# Patient Record
(demographics unavailable — no encounter records)

---

## 2025-01-28 NOTE — ASSESSMENT
[FreeTextEntry1] : ========================== HTN, diastolic LV dysfunction per TTE 3/2023  HLD  SSS;   {AF; s/p PPM; s/p ablation of AF and AT in 2020 (Dr. Massey)  Asc. aorta dilatation  Moderate MR, mild AI  CAD - nuclear stress in 2017 with infero-lat and apical defect c/w infarct  SDH requiring drainage 2015 (was on ASA and fell at that time)

## 2025-01-28 NOTE — REASON FOR VISIT
[FreeTextEntry1] :   Romero Gibbons returns for f/u re SSS/PAF s/p ablation/ PPM, hyperlipidemia, hypertension and coronary artery disease.

## 2025-01-28 NOTE — HISTORY OF PRESENT ILLNESS
[FreeTextEntry1] : 10/18/2021 - Office visit: She denies chest pain, shortness of breath, and palpitations. Saw an endocrinologist - everything OK.  03/04/2022 - Office visit: She denies chest pain, shortness of breath, and palpitations.  07/08/2022 - Office visit: She denies chest pain, shortness of breath, palpitations, and dizziness.  11/14/2022 - Office visit: She denies chest pain, shortness of breath, palpitations, and dizziness.  03/13/2023 - Office visit: PCP: Dr. Adilene Fields She denies chest pain, shortness of breath, palpitations, and dizziness.  07/13/2023 - Office visit: She denies chest pain, shortness of breath, palpitations, and dizziness.  11/09/2023 - Office visit: She denies chest pain, shortness of breath, and palpitations. Her main problem is that of back pain/sciatica.  05/09/2024 - Office visit: She denies chest pain, shortness of breath, and palpitations.   She had blood work last month with her PCP  Sept. 26, 2024 She returns in the company of her daughter.  Since we saw her last, she remains stable.  She reports no episodes of exertional chest discomfort or dyspnea. There have been no palpitations and she is unaware of PPM activity.  She describes no episodes of lightheadedness or loss of consciousness. There have been no episodes of orthopnea or PND.  Currently, her PCP is Dr. Nickerson, as Dr. Fields left the practice. Medications are unchanged.    Jan. 28, 2025: As she returns today, in the company of her daughter Sharon, she remains well.  She continues her usual activities without limitation.  She describes no cardiac symptoms.  She describes no episodes of exertional shortness of breath or exertional chest discomfort.  She reports no palpitations and remains unaware of pacemaker activity.  There have been no episodes of lightheadedness/LOC.  She describes no orthopnea or PND. Medications are unchanged.

## 2025-01-28 NOTE — DISCUSSION/SUMMARY
[EKG obtained to assist in diagnosis and management of assessed problem(s)] : EKG obtained to assist in diagnosis and management of assessed problem(s) [FreeTextEntry1] : EKG today shows:   Predominantly atrial pacing with native AV conduction.  Normal intervals and axis; NS ST/T abn.  No significant change.  PLAN : 1.  Hypertension - BP remains in good range - continue amlodipine 5 mg. daily & metoprolol  mg twice daily - continue low salt diet.  2.  SSS, PAF, s/p [employer] PPM; s/p ablation of PAF, AT (Dr. Massey) in 2020 - continue sotalol 120 daily & metoprolol  twice daily - PPM check in Oct. by EP service was unremarkable; >5 yr battery reserve; scheduled for f/u check this month.  3.  Hypercholesterolemia  - continue atorvastatin 40 daily -  She prefers to have lab work done at an outside lab.  Will give her a prescription for blood work to include a lipid profile and comprehensive metabolic profile.  4.  Chronic A/C for PAF  - continue Eliquis at current dosage  5.  Mild AI, posterior MVP with moderate MR, mild to moderate TR -   remains free of cardiac symptoms. -   Will arrange an echocardiogram at her next office visit to reassess valvular and myocardial function.  Provided Rx to have blood work checked at [facility].   33 minutes spent on today's office visit.    The patient will return for a visit in 4 months.

## 2025-04-16 NOTE — HISTORY OF PRESENT ILLNESS
[FreeTextEntry1] : 86F (Occitan-speaking, daughter translating) PMH HLD, PAF (s/p ablation, 7/2020), HTN, sinus pauses s/p PPM, GERD, Magallanes's, breast CA s/p R mastectomy, and prior SDH s/p craniotomy, presenting for follow-up.  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    She has been compliant with her medications since then.  No recorded arrhythmias since 4/8.  Denies CP, palpitations, lightheadedness/dizziness, pre-syncope/syncope, medication changes, device issues.  Mild exertional dyspnea when lifting heavy objects, and walking up an incline.    EKG: Sinus with atrial pacing @ 70bpm, QTc 332ms  --------------------------------------------- Device details: MDT [id number] Barbara XT DR (Implanted: 6/8/20; SN#: [id number]) RA: Guidant [id number] (Implanted: 9/13/12; SN#: [id number]) RV: CANDIS 4074 Capsure Sense (Implanted: 9/13/12; SN#: [id number])  EP: 7/7/2020 AF ablation: PVI, roof line - sustained AVNRT induced, ; termination: VOD - Mod sinus node dysfunction, severe His purkinje dysfunction   4/8/2025 POCUS TTE: trace pericardial effusion, no WMA.  Preserved LVSF.   4/8/2025 Labs: CBC WNL; Cr 0.45; AST/ALT 44/53; ProBNP 376 11/9/2023 TTE: EF 65%, moderate (grade 2) LV diastolic dysfunction, mild AR, moderate MR (posterior leaflet prolapse), mild-moderate TR (PASP 40 mmHg c/w mild pHTN), AKILA 35 ml/m. No RWMA.

## 2025-04-16 NOTE — END OF VISIT
[Time Spent: ___ minutes] : I have spent [unfilled] minutes of time on the encounter which excludes teaching and separately reported services. [FreeTextEntry3] : I personally performed the history and physical exam and formulated the medical decision making in this patient. I was responsible for more than 50% of the work of this encounter.

## 2025-04-16 NOTE — REASON FOR VISIT
[Arrhythmia/ECG Abnorrmalities] : arrhythmia/ECG abnormalities [Family Member] : family member [FreeTextEntry3] : Jose Shultz MD [FreeTextEntry1] : Sharon (daughter): [phone]

## 2025-04-16 NOTE — HISTORY OF PRESENT ILLNESS
[FreeTextEntry1] : 86F (Slovak-speaking, daughter translating) PMH HLD, PAF (s/p ablation, 7/2020), HTN, sinus pauses s/p PPM, GERD, Magallanes's, breast CA s/p R mastectomy, and prior SDH s/p craniotomy, presenting for follow-up.  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    She has been compliant with her medications since then.  No recorded arrhythmias since 4/8.  Denies CP, palpitations, lightheadedness/dizziness, pre-syncope/syncope, medication changes, device issues.  Mild exertional dyspnea when lifting heavy objects, and walking up an incline.    EKG: Sinus with atrial pacing @ 70bpm, QTc 332ms  --------------------------------------------- Device details: MDT [id number] Barbara XT DR (Implanted: 6/8/20; SN#: [id number]) RA: Guidant 4469 (Implanted: 9/13/12; SN#: [id number]) RV: CANDIS 4074 Capsure Sense (Implanted: 9/13/12; SN#: [id number])  EP: 7/7/2020 AF ablation: PVI, roof line - sustained AVNRT induced, ; termination: VOD - Mod sinus node dysfunction, severe His purkinje dysfunction   4/8/2025 POCUS TTE: trace pericardial effusion, no WMA.  Preserved LVSF.   4/8/2025 Labs: CBC WNL; Cr 0.45; AST/ALT 44/53; ProBNP 376 11/9/2023 TTE: EF 65%, moderate (grade 2) LV diastolic dysfunction, mild AR, moderate MR (posterior leaflet prolapse), mild-moderate TR (PASP 40 mmHg c/w mild pHTN), AKILA 35 ml/m. No RWMA.

## 2025-04-16 NOTE — DISCUSSION/SUMMARY
[EKG obtained to assist in diagnosis and management of assessed problem(s)] : EKG obtained to assist in diagnosis and management of assessed problem(s) [FreeTextEntry1] : PPM w/ normal function, stable lead measurements, 4.8 yrs battery life remaining.  She remains compliant with meds, no arrhythmias since 4/8, overall doing well. QTc stable on sotalol 120mg daily.  Given the absence of symptoms and no recorded arrhythmias, SVT/AVNRT ablation is not urgently needed at this time.  Coronary CTA ordered to assess dyspnea. LRL lowered to 60 (for better CCTA results) and rate response made more aggressive d/t relatively flat HR histogram.

## 2025-04-16 NOTE — REASON FOR VISIT
[Arrhythmia/ECG Abnorrmalities] : arrhythmia/ECG abnormalities [Family Member] : family member [FreeTextEntry3] : Jose Shultz MD [FreeTextEntry1] : Sharon (daughter): [phone]